# Patient Record
Sex: FEMALE | Race: BLACK OR AFRICAN AMERICAN | NOT HISPANIC OR LATINO | URBAN - METROPOLITAN AREA
[De-identification: names, ages, dates, MRNs, and addresses within clinical notes are randomized per-mention and may not be internally consistent; named-entity substitution may affect disease eponyms.]

---

## 2017-10-08 ENCOUNTER — EMERGENCY (EMERGENCY)
Facility: HOSPITAL | Age: 22
LOS: 1 days | Discharge: HOME | End: 2017-10-08
Attending: EMERGENCY MEDICINE
Payer: COMMERCIAL

## 2017-10-08 VITALS
SYSTOLIC BLOOD PRESSURE: 126 MMHG | DIASTOLIC BLOOD PRESSURE: 74 MMHG | HEART RATE: 88 BPM | TEMPERATURE: 98 F | OXYGEN SATURATION: 98 % | RESPIRATION RATE: 16 BRPM

## 2017-10-08 VITALS
SYSTOLIC BLOOD PRESSURE: 136 MMHG | HEIGHT: 63 IN | WEIGHT: 169.98 LBS | DIASTOLIC BLOOD PRESSURE: 82 MMHG | RESPIRATION RATE: 18 BRPM | TEMPERATURE: 98 F | HEART RATE: 90 BPM | OXYGEN SATURATION: 99 %

## 2017-10-08 DIAGNOSIS — L02.416 CUTANEOUS ABSCESS OF LEFT LOWER LIMB: ICD-10-CM

## 2017-10-08 PROCEDURE — 99283 EMERGENCY DEPT VISIT LOW MDM: CPT

## 2017-10-08 RX ORDER — IBUPROFEN 200 MG
400 TABLET ORAL ONCE
Qty: 0 | Refills: 0 | Status: COMPLETED | OUTPATIENT
Start: 2017-10-08 | End: 2017-10-08

## 2017-10-08 RX ORDER — BACITRACIN ZINC 500 UNIT/G
1 OINTMENT IN PACKET (EA) TOPICAL ONCE
Qty: 0 | Refills: 0 | Status: COMPLETED | OUTPATIENT
Start: 2017-10-08 | End: 2017-10-08

## 2017-10-08 RX ORDER — CEPHALEXIN 500 MG
1 CAPSULE ORAL
Qty: 10 | Refills: 0 | OUTPATIENT
Start: 2017-10-08 | End: 2017-10-13

## 2017-10-08 RX ADMIN — Medication 1 APPLICATION(S): at 11:58

## 2017-10-08 RX ADMIN — Medication 400 MILLIGRAM(S): at 12:27

## 2017-10-08 RX ADMIN — Medication 400 MILLIGRAM(S): at 11:57

## 2017-10-08 NOTE — ED PROVIDER NOTE - OBJECTIVE STATEMENT
21 y/o F pt with no significant PMHx presents to ED c/o worsening boil on inner left thigh with associated groin pain x 3 days. Pt reports that boil started out small (initially, pt believed it was an ingrown hair) and got bigger over the course of three days. Pt reports applying a hot compress on boil last night, which caused it to burst this morning, reporting that the discharge was bloody with yellow purulence. Pt denies picking at boil, fever, chills, or any other complaints. NKDA. 23 y/o F pt with no significant PMHx presents to ED c/o worsening boil on inner left thigh with associated groin pain x 3 days. Pt reports that boil started out small (initially, pt believed it was an ingrown hair) and got bigger over the course of three days. Pt reports applying a hot compress on boil last night, which caused it to burst this morning, reporting that the discharge was bloody with yellow purulence. Pt denies picking at boil, fever, chills, or any other complaints. Currently states that there is bleeding from wound and also that she has mild pain at left inguinal region.

## 2017-10-08 NOTE — ED PROVIDER NOTE - CHIEF COMPLAINT
The patient is a 22y Female complaining of The patient is a 22y Female complaining of bleeding from wound

## 2018-09-18 NOTE — ED PROVIDER NOTE - MEDICAL DECISION MAKING DETAILS
Keep your scheduled appointments for a physical and for a colonoscopy.  
23 y/o F presents s/p ruptured abscess with mild bleeding, used pinpoint pressure to cease bleeding, bacitracin applied to wound and covered with gauze. Pt instructed to take ibuprofen for pain (PRN), and will Rx Keflex. Pt advised to f/u if she presents with fever, chills, redness or any other complaints.

## 2018-10-04 NOTE — ED PROVIDER NOTE - CPE EDP NEURO NORM
Status post right knee arthroscopy 8/28/2018    History of present illness: The patient returns today for a followup after RIGHT knee arthroscopy. Pain control has been satisfactory with oral medications. They have completed formal physical therapy. She is actually here primarily complaining of bilateral shoulder pain. She's been seeing her PCP for management of this and has had oral steroids, muscle relaxers and several physical therapy sessions with continued pain. Her bilateral shoulder pain began around the same time as her knee pain, likely from pushing off repeatedly with her shoulders to protect her knee. Her shoulder symptoms have continued to limit her. She had a negative autoimmune workup. She feels like she's made some minimal progress in physical therapy but still has deficits with range of motion. Both shoulders are quite painful but the right ear seems to be most symptomatic. She denies any numbness or tingling. She denies any distinct injury. Pain Assessment  Location of Pain: Knee  Location Modifiers: Right  Severity of Pain: 1  Quality of Pain: Aching  Duration of Pain: A few minutes  Frequency of Pain: Intermittent]    Physical examination: RGHT knee Incisions are well healed with no signs of infection. The patient demonstrates full active range of motion. Quad tone is adequate. Normal gait without the use of ambulatory devices. I also briefly examine the bilateral shoulders. She has mild tenderness anteriorly involving the bilateral shoulders but no bony tenderness. Range of motion of the RIGHT shoulder is approximately passively 0-100° with significant pain. Abduction and internal excellent rotation are also quite limited. Active range of motion of the lower shoulder 0-120° but with pain and significant limitations. Neurovascularly intact bilaterally.     She had previous bilateral shoulder x-rays by her PCP which did not demonstrate any acute bony
normal...

## 2024-04-03 NOTE — ED PROVIDER NOTE - ATTESTATION, MLM
yesterday
I have reviewed and confirmed nurses' notes for patient's medications, allergies, medical history, and surgical history.